# Patient Record
Sex: MALE | Race: WHITE | NOT HISPANIC OR LATINO | Employment: OTHER | URBAN - METROPOLITAN AREA
[De-identification: names, ages, dates, MRNs, and addresses within clinical notes are randomized per-mention and may not be internally consistent; named-entity substitution may affect disease eponyms.]

---

## 2023-11-23 PROBLEM — E11.9 TYPE 2 DIABETES MELLITUS, WITH LONG-TERM CURRENT USE OF INSULIN: Chronic | Status: ACTIVE | Noted: 2023-11-23

## 2023-11-23 PROBLEM — Z79.4 TYPE 2 DIABETES MELLITUS, WITH LONG-TERM CURRENT USE OF INSULIN: Chronic | Status: ACTIVE | Noted: 2023-11-23

## 2023-11-23 PROBLEM — E16.2 HYPOGLYCEMIA: Status: ACTIVE | Noted: 2023-11-23

## 2024-08-16 PROBLEM — R74.01 TRANSAMINITIS: Status: ACTIVE | Noted: 2024-08-16

## 2024-08-16 PROBLEM — E87.1 HYPONATREMIA: Status: ACTIVE | Noted: 2024-08-16

## 2024-08-16 PROBLEM — E11.10 TYPE 2 DIABETES MELLITUS WITH KETOACIDOSIS WITHOUT COMA, WITH LONG-TERM CURRENT USE OF INSULIN: Status: ACTIVE | Noted: 2023-11-23

## 2024-08-16 PROBLEM — Z79.4 TYPE 2 DIABETES MELLITUS WITH KETOACIDOSIS WITHOUT COMA, WITH LONG-TERM CURRENT USE OF INSULIN: Status: ACTIVE | Noted: 2023-11-23

## 2024-08-16 PROBLEM — R57.8 HEMORRHAGIC SHOCK: Status: ACTIVE | Noted: 2024-08-16

## 2024-08-16 PROBLEM — K92.1 GASTROINTESTINAL HEMORRHAGE WITH MELENA: Status: ACTIVE | Noted: 2024-08-16

## 2024-08-20 PROBLEM — E11.65 UNCONTROLLED TYPE 2 DIABETES MELLITUS WITH HYPERGLYCEMIA: Status: ACTIVE | Noted: 2024-08-20

## 2024-08-21 PROBLEM — R10.9 ABDOMINAL PAIN: Status: ACTIVE | Noted: 2024-08-21

## 2024-08-21 PROBLEM — R19.7 DIARRHEA: Status: ACTIVE | Noted: 2024-08-21

## 2024-08-21 PROBLEM — D62 ACUTE BLOOD LOSS ANEMIA: Status: ACTIVE | Noted: 2024-08-21

## 2024-08-23 PROBLEM — E63.9 INADEQUATE DIETARY ENERGY INTAKE: Status: ACTIVE | Noted: 2024-08-23

## 2024-08-27 PROBLEM — D64.9 ANEMIA: Status: ACTIVE | Noted: 2024-08-27

## 2024-08-27 PROBLEM — D73.5 SPLENIC INFARCT: Status: ACTIVE | Noted: 2024-08-27

## 2024-08-27 PROBLEM — A04.72 CLOSTRIDIUM DIFFICILE COLITIS: Status: ACTIVE | Noted: 2024-08-21

## 2024-08-29 PROBLEM — R60.1 GENERALIZED EDEMA: Status: ACTIVE | Noted: 2024-08-29

## 2024-10-29 ENCOUNTER — OFFICE VISIT (OUTPATIENT)
Dept: HEPATOLOGY | Facility: CLINIC | Age: 59
End: 2024-10-29
Payer: COMMERCIAL

## 2024-10-29 ENCOUNTER — TELEPHONE (OUTPATIENT)
Dept: HEPATOLOGY | Facility: CLINIC | Age: 59
End: 2024-10-29

## 2024-10-29 ENCOUNTER — LAB VISIT (OUTPATIENT)
Dept: LAB | Facility: HOSPITAL | Age: 59
End: 2024-10-29
Attending: INTERNAL MEDICINE
Payer: COMMERCIAL

## 2024-10-29 VITALS
SYSTOLIC BLOOD PRESSURE: 148 MMHG | HEART RATE: 63 BPM | BODY MASS INDEX: 25.94 KG/M2 | TEMPERATURE: 98 F | DIASTOLIC BLOOD PRESSURE: 68 MMHG | WEIGHT: 181.19 LBS | OXYGEN SATURATION: 98 % | RESPIRATION RATE: 18 BRPM | HEIGHT: 70 IN

## 2024-10-29 DIAGNOSIS — K74.60 CIRRHOSIS OF LIVER WITH ASCITES, UNSPECIFIED HEPATIC CIRRHOSIS TYPE: Primary | ICD-10-CM

## 2024-10-29 DIAGNOSIS — R18.8 CIRRHOSIS OF LIVER WITH ASCITES, UNSPECIFIED HEPATIC CIRRHOSIS TYPE: Primary | ICD-10-CM

## 2024-10-29 DIAGNOSIS — K74.60 CIRRHOSIS OF LIVER WITH ASCITES, UNSPECIFIED HEPATIC CIRRHOSIS TYPE: ICD-10-CM

## 2024-10-29 DIAGNOSIS — R18.8 CIRRHOSIS OF LIVER WITH ASCITES, UNSPECIFIED HEPATIC CIRRHOSIS TYPE: ICD-10-CM

## 2024-10-29 LAB
AFP SERPL-MCNC: 4.4 NG/ML (ref 0–8.4)
ALBUMIN SERPL BCP-MCNC: 3.4 G/DL (ref 3.5–5.2)
ALP SERPL-CCNC: 131 U/L (ref 40–150)
ALT SERPL W/O P-5'-P-CCNC: 27 U/L (ref 10–44)
ANION GAP SERPL CALC-SCNC: 8 MMOL/L (ref 8–16)
AST SERPL-CCNC: 50 U/L (ref 10–40)
BASOPHILS # BLD AUTO: 0.05 K/UL (ref 0–0.2)
BASOPHILS NFR BLD: 0.8 % (ref 0–1.9)
BILIRUB SERPL-MCNC: 0.8 MG/DL (ref 0.1–1)
BUN SERPL-MCNC: 19 MG/DL (ref 8–23)
CALCIUM SERPL-MCNC: 9.6 MG/DL (ref 8.7–10.5)
CHLORIDE SERPL-SCNC: 104 MMOL/L (ref 95–110)
CO2 SERPL-SCNC: 23 MMOL/L (ref 23–29)
CREAT SERPL-MCNC: 0.9 MG/DL (ref 0.5–1.4)
DIFFERENTIAL METHOD BLD: ABNORMAL
EOSINOPHIL # BLD AUTO: 0 K/UL (ref 0–0.5)
EOSINOPHIL NFR BLD: 0.6 % (ref 0–8)
ERYTHROCYTE [DISTWIDTH] IN BLOOD BY AUTOMATED COUNT: 17.7 % (ref 11.5–14.5)
EST. GFR  (NO RACE VARIABLE): >60 ML/MIN/1.73 M^2
GLUCOSE SERPL-MCNC: 302 MG/DL (ref 70–110)
HCT VFR BLD AUTO: 34.6 % (ref 40–54)
HGB BLD-MCNC: 10.3 G/DL (ref 14–18)
IMM GRANULOCYTES # BLD AUTO: 0.02 K/UL (ref 0–0.04)
IMM GRANULOCYTES NFR BLD AUTO: 0.3 % (ref 0–0.5)
INR PPP: 1 (ref 0.8–1.2)
LYMPHOCYTES # BLD AUTO: 1.3 K/UL (ref 1–4.8)
LYMPHOCYTES NFR BLD: 21.1 % (ref 18–48)
MCH RBC QN AUTO: 25 PG (ref 27–31)
MCHC RBC AUTO-ENTMCNC: 29.8 G/DL (ref 32–36)
MCV RBC AUTO: 84 FL (ref 82–98)
MONOCYTES # BLD AUTO: 0.8 K/UL (ref 0.3–1)
MONOCYTES NFR BLD: 12.5 % (ref 4–15)
NEUTROPHILS # BLD AUTO: 4 K/UL (ref 1.8–7.7)
NEUTROPHILS NFR BLD: 64.7 % (ref 38–73)
NRBC BLD-RTO: 0 /100 WBC
PLATELET # BLD AUTO: 234 K/UL (ref 150–450)
PMV BLD AUTO: 10.4 FL (ref 9.2–12.9)
POTASSIUM SERPL-SCNC: 5.2 MMOL/L (ref 3.5–5.1)
PROT SERPL-MCNC: 8.2 G/DL (ref 6–8.4)
PROTHROMBIN TIME: 11.3 SEC (ref 9–12.5)
RBC # BLD AUTO: 4.12 M/UL (ref 4.6–6.2)
SODIUM SERPL-SCNC: 135 MMOL/L (ref 136–145)
WBC # BLD AUTO: 6.22 K/UL (ref 3.9–12.7)

## 2024-10-29 PROCEDURE — 82105 ALPHA-FETOPROTEIN SERUM: CPT | Performed by: INTERNAL MEDICINE

## 2024-10-29 PROCEDURE — 3078F DIAST BP <80 MM HG: CPT | Mod: CPTII,S$GLB,, | Performed by: INTERNAL MEDICINE

## 2024-10-29 PROCEDURE — 85610 PROTHROMBIN TIME: CPT | Performed by: INTERNAL MEDICINE

## 2024-10-29 PROCEDURE — 99999 PR PBB SHADOW E&M-EST. PATIENT-LVL IV: CPT | Mod: PBBFAC,,, | Performed by: INTERNAL MEDICINE

## 2024-10-29 PROCEDURE — 3044F HG A1C LEVEL LT 7.0%: CPT | Mod: CPTII,S$GLB,, | Performed by: INTERNAL MEDICINE

## 2024-10-29 PROCEDURE — 3077F SYST BP >= 140 MM HG: CPT | Mod: CPTII,S$GLB,, | Performed by: INTERNAL MEDICINE

## 2024-10-29 PROCEDURE — 4010F ACE/ARB THERAPY RXD/TAKEN: CPT | Mod: CPTII,S$GLB,, | Performed by: INTERNAL MEDICINE

## 2024-10-29 PROCEDURE — 3008F BODY MASS INDEX DOCD: CPT | Mod: CPTII,S$GLB,, | Performed by: INTERNAL MEDICINE

## 2024-10-29 PROCEDURE — 99205 OFFICE O/P NEW HI 60 MIN: CPT | Mod: S$GLB,,, | Performed by: INTERNAL MEDICINE

## 2024-10-29 PROCEDURE — 36415 COLL VENOUS BLD VENIPUNCTURE: CPT | Performed by: INTERNAL MEDICINE

## 2024-10-29 PROCEDURE — 80053 COMPREHEN METABOLIC PANEL: CPT | Performed by: INTERNAL MEDICINE

## 2024-10-29 PROCEDURE — 85025 COMPLETE CBC W/AUTO DIFF WBC: CPT | Performed by: INTERNAL MEDICINE

## 2024-10-29 PROCEDURE — 1159F MED LIST DOCD IN RCRD: CPT | Mod: CPTII,S$GLB,, | Performed by: INTERNAL MEDICINE

## 2024-10-30 ENCOUNTER — TELEPHONE (OUTPATIENT)
Dept: HEPATOLOGY | Facility: CLINIC | Age: 59
End: 2024-10-30
Payer: COMMERCIAL

## 2024-12-24 ENCOUNTER — TELEPHONE (OUTPATIENT)
Dept: HEPATOLOGY | Facility: CLINIC | Age: 59
End: 2024-12-24
Payer: COMMERCIAL

## 2024-12-24 NOTE — TELEPHONE ENCOUNTER
----- Message from Liana Lopez MD sent at 12/23/2024  3:33 PM CST -----  Liver function tests have much improved.  Glucose is out of control, for which, he needs to see his PCP.

## 2025-01-07 ENCOUNTER — TELEPHONE (OUTPATIENT)
Dept: HEPATOLOGY | Facility: CLINIC | Age: 60
End: 2025-01-07
Payer: COMMERCIAL

## 2025-01-07 NOTE — TELEPHONE ENCOUNTER
"----- Message from Fidel sent at 1/7/2025 12:15 PM CST -----  Regarding: call back  Consult/Advisory:        Name Of Caller: Self     Contact Preference?:[phone]     What is the nature of the call?: Calling to speak w/ someone in regards to rescheduling his appt to virtual visit requesting call back       Additional Notes:  "Thank you for all that you do for our patients"  "

## 2025-01-14 ENCOUNTER — OFFICE VISIT (OUTPATIENT)
Dept: HEPATOLOGY | Facility: CLINIC | Age: 60
End: 2025-01-14
Payer: COMMERCIAL

## 2025-01-14 ENCOUNTER — TELEPHONE (OUTPATIENT)
Dept: HEPATOLOGY | Facility: CLINIC | Age: 60
End: 2025-01-14

## 2025-01-14 DIAGNOSIS — K75.81 NONALCOHOLIC STEATOHEPATITIS: ICD-10-CM

## 2025-01-14 DIAGNOSIS — K74.60 CIRRHOSIS OF LIVER WITHOUT ASCITES, UNSPECIFIED HEPATIC CIRRHOSIS TYPE: Primary | ICD-10-CM

## 2025-01-14 DIAGNOSIS — K75.81 METABOLIC DYSFUNCTION-ASSOCIATED STEATOHEPATITIS (MASH): ICD-10-CM

## 2025-01-14 PROCEDURE — 1159F MED LIST DOCD IN RCRD: CPT | Mod: CPTII,95,, | Performed by: INTERNAL MEDICINE

## 2025-01-14 PROCEDURE — 4010F ACE/ARB THERAPY RXD/TAKEN: CPT | Mod: CPTII,95,, | Performed by: INTERNAL MEDICINE

## 2025-01-14 PROCEDURE — 1160F RVW MEDS BY RX/DR IN RCRD: CPT | Mod: CPTII,95,, | Performed by: INTERNAL MEDICINE

## 2025-01-14 PROCEDURE — 98006 SYNCH AUDIO-VIDEO EST MOD 30: CPT | Mod: 95,,, | Performed by: INTERNAL MEDICINE

## 2025-01-14 NOTE — TELEPHONE ENCOUNTER
Pt was seen today and already have US and AFP appointment in February 25. Put 6 mos recall for F/U, US and AFP and other labs for Aug 25.

## 2025-01-14 NOTE — TELEPHONE ENCOUNTER
----- Message from Liana Lopez MD sent at 1/14/2025  9:25 AM CST -----  Recommendations:  -  Labs every 6 months, start in August 2025:  CBC, CMP, PT INR   -  Low salt in the diet, avoid canned, bottled and processed foods.  -  Continue current meds  -  Ultrasound of abdomen and AFP every 6 months, next due in Feb 2025, after that repeat both in August 2025.     -  No more alcohol, stop smoking, sedatives and meds with codeine.  -  Avoid high intake of Tylenol (more than 4 extra-strength pills in one day)  -  Call us if any bleeding, fevers, confusion, disorientation occur  -  Endoscopy: per GI   -  Transplant option discussed, will evaluate when more problems with decomp occur.  -  Education provided: liver disease, cirrhosis, HCC, nutrition, fluid overload prevention, diuretic use, encephalopathy prevention, Alcohol Rehab, monitoring and follow-up.   -  Return in August after labs available.  months.

## 2025-01-14 NOTE — PROGRESS NOTES
"   [facility] Consultation Note    THIS IS A VIDEO VISIT, THEREFORE, SOME ELEMENTS OF THE PHYSICAL EXAM, SUCH AS VITAL SIGNS, HEART SOUNDS OR BREATH SOUNDS ARE NOT INCLUDED.  ANY SYMPTOMS OR SIGNS THAT WERE VISUALIZED OR STATED BY THE PATIENT MAY BE INCLUDED IN THIS NOTE..  The patient location is: home  The chief complaint leading to consultation is: follow-up of cirrhosis    Visit type: audiovisual    Face to Face time with patient: 12 min  20 minutes of total time spent on the encounter, which includes face to face time and non-face to face time preparing to see the patient (eg, review of tests), Obtaining and/or reviewing separately obtained history, Documenting clinical information in the electronic or other health record, Independently interpreting results (not separately reported) and communicating results to the patient/family/caregiver, or Care coordination (not separately reported).     Each patient to whom he or she provides medical services by telemedicine is:  (1) informed of the relationship between the physician and patient and the respective role of any other health care provider with respect to management of the patient; and (2) notified that he or she may decline to receive medical services by telemedicine and may withdraw from such care at any time.    Notes:            Reason for Consult:  There were no encounter diagnoses.    PCP: Lucien Xie       HPI:  This is a 59 y.o. male here for evaluation of: cirrhosis    Left sided upper abd pain, is improved.    MELD score is 6 on the labs from 12/23/24.   Stays abstinent from alcohol.  Not a single drop of alcohol.     We will do surveillance for HCC,     Fire code is on, but we have completed our conversation.          Past events: 10/29/24  Patient's wife gave this history:   60 y/o male with family h/o of cirrhosis, his father and brother had cirrhosis.  Patient had drank alcohol "all his alcohol", stopped approx 3 months ago.  " Found out about cirrhosis 10-12 yrs ago.  Per wife, he was told not to drink, patient states they did not tell him to stop, doctor told him to decrease intake. Has completely stopped approx 3 mon ago.      Abd felt full, and legs swollen since Aug 16, 2024, had to be given two units of blood transfusion, had extremely dark stool about a week before going into the hosp.  In the hosp, they told him his stool had blood, it stopped by the time he was leaving the hosp.  Accord to wife, his eyes have been cream colored over the pat 2-3 yr, he has had confusion, approx within the past 1 year. It was not severe enough to go to the hosp.  He did go to the ER for low blood glucose. Has chronic diarrhea x last 12 yrs, he had stools frequent enough to not be able to keep a job. He had C diff colitis, last admission.  Has lost wt over past 6 months, around 200 - now 173 - 180.  Has noticed muscle wasting.      Recent admission to [facility] 8/16/24, stayed 2 weeks and d/c on 8/30/24: for hypoglycemia, hemorrhagic shock, C diff colitis, splenic infarcts, needed transfusions, no source found on EGD.  Still has chronic diarrhea.  LFTs were elevated throughout the admission, but did not find out what caused it.      Has been admitted multiple times recently or hypoglycemia, and salmonella (7 yrs ago). Has seen endocrinologist once in the [place] area.     No other major ailments.      Other problems: HTN, T2DM, nicotine dependence, hypoglycemia, fracture of ribs, sternum, thoracic spine.      MELD 3.0: 6 at 12/23/2024  8:08 AM  MELD-Na: 6 at 12/23/2024  8:08 AM  Calculated from:  Serum Creatinine: 0.79 mg/dL (Using min of 1 mg/dL) at 12/23/2024  8:08 AM  Serum Sodium: 137 mmol/L at 12/23/2024  8:08 AM  Total Bilirubin: 0.6 mg/dL (Using min of 1 mg/dL) at 12/23/2024  8:08 AM  Serum Albumin: 3.5 g/dL at 12/23/2024  8:08 AM  INR(ratio): 1.0 at 12/23/2024  8:07 AM  Age at listing (hypothetical): 59 years  Sex: Male  at 12/23/2024  8:08 AM     Elevated liver enzymes: Yes - AST is (48) still declining   Abnormal imaging: Yes - cirrhosis, gallstones, splenic infarcts  Cirrhosis: Yes  Hepatitis C: No  Hepatitis B: No  Fatty liver: No  Encephalopathy: Yes - very mild  Post-hospital discharge: No  Symptoms: pain left flank left side abd to around to the back.       Primary hepatic manifestations:  Fatigue:Yes  Edema:Yes  Ascites:Yes  Encephalopathy:Yes  Abdominal pain:Yes  GI bleeds: Yes  Pruritus:No  Weight Changes:Yes  Changes in Bowel habits: Yes  Muscle cramps:No    Risk factors for liver disease:  No jaundice  No transfusions  No IVDU  Did not snort cocaine or similar agents  Did not live with anyone with hepatitis B or C  Sexual partner not tested  No hepatotoxic medications  No exposure to industrial toxins  Alcohol: as above      ROS:  Constitutional: No fevers, chills, weight changes, fatigue  ENT: No allergies, nosebleeds,   CV: No chest pain  Pulm: No cough, shortness of breath  Ophtho: No vision changes  GI/Liver: see HPI  Derm: No rash, itching  Heme: No swollen glands, bruising  MSK: No joint pains, joint swelling  : No dysuria, hematuria, decrease in urine output  Endo: No hot or cold intolerance  Neuro: No confusion, disorientation, difficulty with sleep, memory, concentration, syncope, seizure  Psych: No anxiety, depression    Medical History:  has a past medical history of Diabetes, HTN (hypertension), Hypercholesteremia, and RLS (restless legs syndrome).    Surgical History:  has a past surgical history that includes Esophagogastroduodenoscopy (N/A, 08/19/2024) and Cervical disc surgery.    Family History: family history includes Diabetes in his father; No Known Problems in his mother..     Social History:  reports that he has been smoking cigarettes. He started smoking about 40 years ago. He has a 40 pack-year smoking history. He has never been exposed to tobacco smoke. He does not have any smokeless tobacco  "history on file. He reports current drug use.    Review of patient's allergies indicates:  No Known Allergies    Current Outpatient Rx   Medication Sig Dispense Refill    empagliflozin (JARDIANCE) 25 mg tablet Take 1 tablet (25 mg total) by mouth once daily. 90 tablet 3    furosemide (LASIX) 40 MG tablet Take 40 mg by mouth.      insulin aspart U-100 (NOVOLOG FLEXPEN U-100 INSULIN) 100 unit/mL (3 mL) InPn pen Take 6 units with each meal plus sliding scale as instructed- max dose of 80 units/day 15 mL 3    insulin degludec (TRESIBA FLEXTOUCH U-100) 100 unit/mL (3 mL) insulin pen Inject 20 units once daily 5 Pen 3    lisinopriL (PRINIVIL,ZESTRIL) 20 MG tablet Take 20 mg by mouth once daily.      meloxicam (MOBIC) 15 MG tablet Take 15 mg by mouth once daily.      pen needle, diabetic (BD ULTRA-FINE MICRO PEN NEEDLE) 32 gauge x 1/4" Ndle Use to inject insulin 4-5 times a day as instructed 100 each 3    pramlintide (SYMLINPEN 60) 1,500 mcg/1.5 mL injection Inject 0.06 mLs (60 mcg total) into the skin 3 (three) times daily before meals. 3 mL 6    pregabalin (LYRICA) 50 MG capsule Take 50 mg by mouth 2 (two) times daily.      simvastatin (ZOCOR) 80 MG tablet Take 80 mg by mouth every evening.      SITagliptin phosphate (JANUVIA) 100 MG Tab Take by mouth once daily.      spironolactone (ALDACTONE) 50 MG tablet Take 50 mg by mouth once daily.         Objective Findings:    Vital Signs:  There were no vitals taken for this visit.  There is no height or weight on file to calculate BMI.    Physical Exam:  General Appearance: Well appearing in no acute distress  Head:   Normocephalic, without obvious abnormality  Eyes:    No scleral icterus, EOMI  ENT: Neck supple, Lips, mucosa, and tongue normal; teeth and gums normal  Lungs: CTA bilaterally in anterior and posterior fields, no wheezes, no crackles.  Heart:  Regular rate and rhythm, S1, S2 normal, no murmurs heard  Abdomen: Soft, non tender, non distended with positive bowel " "sounds in all four quadrants. No hepatosplenomegaly, ascites, or mass  Extremities: 2+ pulses, no clubbing, cyanosis or edema  Skin: No rash  Neurologic: CN II-XII intact, alert, oriented x 3. No asterixis      Labs:  Lab Results   Component Value Date    WBC 3.70 (L) 12/23/2024    HGB 10.1 (L) 12/23/2024    HCT 32.3 (L) 12/23/2024     12/23/2024    INR 1.0 12/23/2024    CREATININE 0.79 (L) 12/23/2024    BUN 19 12/23/2024    BILITOT 0.6 12/23/2024    ALT 27 12/23/2024    AST 48 12/23/2024    ALKPHOS 127 12/23/2024     12/23/2024    K 4.5 12/23/2024     12/23/2024    CO2 24 12/23/2024    HGBA1C 6.1 (H) 08/29/2024    AFP 4.4 10/29/2024         Current Meds:  Current Outpatient Medications   Medication Sig    empagliflozin (JARDIANCE) 25 mg tablet Take 1 tablet (25 mg total) by mouth once daily.    furosemide (LASIX) 40 MG tablet Take 40 mg by mouth.    insulin aspart U-100 (NOVOLOG FLEXPEN U-100 INSULIN) 100 unit/mL (3 mL) InPn pen Take 6 units with each meal plus sliding scale as instructed- max dose of 80 units/day    insulin degludec (TRESIBA FLEXTOUCH U-100) 100 unit/mL (3 mL) insulin pen Inject 20 units once daily    lisinopriL (PRINIVIL,ZESTRIL) 20 MG tablet Take 20 mg by mouth once daily.    meloxicam (MOBIC) 15 MG tablet Take 15 mg by mouth once daily.    pen needle, diabetic (BD ULTRA-FINE MICRO PEN NEEDLE) 32 gauge x 1/4" Ndle Use to inject insulin 4-5 times a day as instructed    pramlintide (SYMLINPEN 60) 1,500 mcg/1.5 mL injection Inject 0.06 mLs (60 mcg total) into the skin 3 (three) times daily before meals.    pregabalin (LYRICA) 50 MG capsule Take 50 mg by mouth 2 (two) times daily.    simvastatin (ZOCOR) 80 MG tablet Take 80 mg by mouth every evening.    SITagliptin phosphate (JANUVIA) 100 MG Tab Take by mouth once daily.    spironolactone (ALDACTONE) 50 MG tablet Take 50 mg by mouth once daily.     No current facility-administered medications for this visit.        Imaging: " "      Endoscopy:      Labs:  MELD 3.0: 6 at 12/23/2024  8:08 AM  MELD-Na: 6 at 12/23/2024  8:08 AM  Calculated from:  Serum Creatinine: 0.79 mg/dL (Using min of 1 mg/dL) at 12/23/2024  8:08 AM  Serum Sodium: 137 mmol/L at 12/23/2024  8:08 AM  Total Bilirubin: 0.6 mg/dL (Using min of 1 mg/dL) at 12/23/2024  8:08 AM  Serum Albumin: 3.5 g/dL at 12/23/2024  8:08 AM  INR(ratio): 1.0 at 12/23/2024  8:07 AM  Age at listing (hypothetical): 59 years  Sex: Male at 12/23/2024  8:08 AM       Assessment:  Cirrhosis - "hereditary" with father, aunt, brother having it.  Though he has a h/o alcohol intake. Stopped approx 3 months ago (Aug 2024)  Chronic liver failure with bleeds, anemia, fluid retention, mild encephalopathy.   Will observe for a couple of months, if problems continue, will start the transplant evaluation.    Recommendations:  -  Labs every 6 months, start in August 2025:  CBC, CMP, PT INR   -  Low salt in the diet, avoid canned, bottled and processed foods.  -  Continue current meds  -  Ultrasound of abdomen and AFP every 6 months, next due in Feb 2025, after that repeat both in August 2025.     -  No more alcohol, stop smoking, sedatives and meds with codeine.  -  Avoid high intake of Tylenol (more than 4 extra-strength pills in one day)  -  Call us if any bleeding, fevers, confusion, disorientation occur  -  Endoscopy: per GI   -  Transplant option discussed, will evaluate when more problems with decomp occur.  -  Education provided: liver disease, cirrhosis, HCC, nutrition, fluid overload prevention, diuretic use, encephalopathy prevention, Alcohol Rehab, monitoring and follow-up.   -  Return in August 2025 after labs available.          Follow up in about 7 months (around 8/14/2025).      Order summary:  No orders of the defined types were placed in this encounter.      Thank you so much for allowing me to participate in the care of Jose Raul Lopez MD      "

## 2025-01-14 NOTE — PATIENT INSTRUCTIONS
Recommendations:  -  Labs every 6 months, start in August 2025:  CBC, CMP, PT INR   -  Low salt in the diet, avoid canned, bottled and processed foods.  -  Continue current meds  -  Ultrasound of abdomen and AFP every 6 months, next due in Feb 2025, after that repeat both in August 2025.     -  No more alcohol, stop smoking, sedatives and meds with codeine.  -  Avoid high intake of Tylenol (more than 4 extra-strength pills in one day)  -  Call us if any bleeding, fevers, confusion, disorientation occur  -  Endoscopy: per GI   -  Transplant option discussed, will evaluate when more problems with decomp occur.  -  Education provided: liver disease, cirrhosis, HCC, nutrition, fluid overload prevention, diuretic use, encephalopathy prevention, Alcohol Rehab, monitoring and follow-up.   -  Return in August 2025 after labs available.

## 2025-01-14 NOTE — Clinical Note
Recommendations: -  Labs every 6 months, start in August 2025:  CBC, CMP, PT INR  -  Low salt in the diet, avoid canned, bottled and processed foods. -  Continue current meds -  Ultrasound of abdomen and AFP every 6 months, next due in Feb 2025, after that repeat both in August 2025.    -  No more alcohol, stop smoking, sedatives and meds with codeine. -  Avoid high intake of Tylenol (more than 4 extra-strength pills in one day) -  Call us if any bleeding, fevers, confusion, disorientation occur -  Endoscopy: per GI  -  Transplant option discussed, will evaluate when more problems with decomp occur. -  Education provided: liver disease, cirrhosis, HCC, nutrition, fluid overload prevention, diuretic use, encephalopathy prevention, Alcohol Rehab, monitoring and follow-up.  -  Return in August after labs available.  months.

## 2025-02-18 ENCOUNTER — RESULTS FOLLOW-UP (OUTPATIENT)
Dept: TRANSPLANT | Facility: CLINIC | Age: 60
End: 2025-02-18
Payer: COMMERCIAL

## 2025-02-18 NOTE — TELEPHONE ENCOUNTER
----- Message from Liana Lopez MD sent at 2/18/2025 12:27 PM CST -----  Please inform patient:  Ultrasound showed:  Cirrhosis, spleen is mildly enlarged. No tumor in the liver.   No fluid in the abdomen.   ----- Message -----  From: Interface, Rad Results In  Sent: 2/18/2025   9:51 AM CST  To: Liana Lopez MD

## 2025-08-12 ENCOUNTER — TELEPHONE (OUTPATIENT)
Dept: HEPATOLOGY | Facility: CLINIC | Age: 60
End: 2025-08-12

## 2025-08-12 ENCOUNTER — OFFICE VISIT (OUTPATIENT)
Dept: HEPATOLOGY | Facility: CLINIC | Age: 60
End: 2025-08-12
Payer: COMMERCIAL

## 2025-08-12 DIAGNOSIS — K75.81 METABOLIC DYSFUNCTION-ASSOCIATED STEATOHEPATITIS (MASH): ICD-10-CM

## 2025-08-12 DIAGNOSIS — K74.60 CIRRHOSIS OF LIVER WITHOUT ASCITES, UNSPECIFIED HEPATIC CIRRHOSIS TYPE: Primary | ICD-10-CM

## 2025-08-12 PROCEDURE — 98005 SYNCH AUDIO-VIDEO EST LOW 20: CPT | Mod: 95,,, | Performed by: INTERNAL MEDICINE

## 2025-08-12 PROCEDURE — 4010F ACE/ARB THERAPY RXD/TAKEN: CPT | Mod: CPTII,95,, | Performed by: INTERNAL MEDICINE

## 2025-08-12 PROCEDURE — 3044F HG A1C LEVEL LT 7.0%: CPT | Mod: CPTII,95,, | Performed by: INTERNAL MEDICINE
